# Patient Record
Sex: MALE | Race: WHITE | NOT HISPANIC OR LATINO | Employment: STUDENT | URBAN - METROPOLITAN AREA
[De-identification: names, ages, dates, MRNs, and addresses within clinical notes are randomized per-mention and may not be internally consistent; named-entity substitution may affect disease eponyms.]

---

## 2018-08-23 ENCOUNTER — RADIANT APPOINTMENT (OUTPATIENT)
Dept: ULTRASOUND IMAGING | Facility: CLINIC | Age: 23
End: 2018-08-23
Attending: FAMILY MEDICINE
Payer: COMMERCIAL

## 2018-08-23 DIAGNOSIS — N50.89 LUMP IN THE TESTICLE: ICD-10-CM

## 2019-04-24 ENCOUNTER — ANCILLARY PROCEDURE (OUTPATIENT)
Dept: MRI IMAGING | Facility: CLINIC | Age: 24
End: 2019-04-24
Attending: FAMILY MEDICINE
Payer: COMMERCIAL

## 2019-04-24 DIAGNOSIS — S79.911A INJURY OF RIGHT HIP: ICD-10-CM

## 2020-01-03 ENCOUNTER — MEDICAL CORRESPONDENCE (OUTPATIENT)
Dept: HEALTH INFORMATION MANAGEMENT | Facility: CLINIC | Age: 25
End: 2020-01-03

## 2020-01-03 ENCOUNTER — TRANSFERRED RECORDS (OUTPATIENT)
Dept: HEALTH INFORMATION MANAGEMENT | Facility: CLINIC | Age: 25
End: 2020-01-03

## 2020-01-07 ENCOUNTER — ANCILLARY PROCEDURE (OUTPATIENT)
Dept: CARDIOLOGY | Facility: CLINIC | Age: 25
End: 2020-01-07
Attending: FAMILY MEDICINE
Payer: COMMERCIAL

## 2020-01-07 DIAGNOSIS — Z13.6 SCREENING FOR HEART DISEASE: ICD-10-CM

## 2020-01-28 ENCOUNTER — TRANSFERRED RECORDS (OUTPATIENT)
Dept: HEALTH INFORMATION MANAGEMENT | Facility: CLINIC | Age: 25
End: 2020-01-28

## 2020-02-10 ENCOUNTER — MEDICAL CORRESPONDENCE (OUTPATIENT)
Dept: HEALTH INFORMATION MANAGEMENT | Facility: CLINIC | Age: 25
End: 2020-02-10

## 2020-02-10 ENCOUNTER — TRANSFERRED RECORDS (OUTPATIENT)
Dept: HEALTH INFORMATION MANAGEMENT | Facility: CLINIC | Age: 25
End: 2020-02-10

## 2020-02-11 NOTE — TELEPHONE ENCOUNTER
MEDICAL RECORDS REQUEST   [facility]  Urology Clinic  [address] [place] [address]  PHONE: [phone]  Fax: [phone]        FUTURE VISIT INFORMATION                                                   Miguel LeoraGILBERTO rojas: 1995 scheduled for future visit at [facility]    APPOINTMENT INFORMATION:    Date: 20 3PM    Provider:  Peyton Gleason MD     Reason for Visit/Diagnosis: Urethritis     REFERRAL INFORMATION:    Referring provider:  Belinda Lock    Specialty: PA-C    Referring providers clinic:  [facility] contact number:  [phone]    RECORDS REQUESTED FOR VISIT                                                     NOTES  STATUS/DETAILS   OFFICE NOTE from referring provider  yes   OFFICE NOTE from other specialist  no   DISCHARGE SUMMARY from hospital  no   DISCHARGE REPORT from the ER  no   OPERATIVE REPORT  no   MEDICATION LIST  yes     PRE-VISIT CHECKLIST      Record collection complete YES- Guillermo recs scanned in [facility] / Labs scanned in [facility]  No- Fax to [place] to obtain recs ASAP    Appointment appropriately scheduled           (right time/right provider) Yes   MyChart activation If no, please explain: In process    Questionnaire complete If no, please explain: In process      Completed by: Clarisa Hernandez

## 2020-02-12 ENCOUNTER — TRANSFERRED RECORDS (OUTPATIENT)
Dept: HEALTH INFORMATION MANAGEMENT | Facility: CLINIC | Age: 25
End: 2020-02-12

## 2020-02-18 NOTE — PROGRESS NOTES
"        Chief Complaint:   Urethritis         History of Present Illness:    Miguel Fleming is a very pleasant 24 year old male who presents for evaluation of the above. Per chart review, he was previously evaluated at [facility] for c/o burning with urination x3 days. Denied associated symptoms. STI testing was conducted, which was negative. UA/UC and mycoplasma/ureaplasma cultures were also sent, and ureaplasma came back positive. He was therefore treated with a course of doxycycline. The burning he was experiencing resolved, but he then reported difficulty with dribbling after urinating.     Today, he states that he continues to have \"disomfort\" when voiding, along with post-void dribbling. This has improved somewhat after his doxycycline course, but symptoms remain bothersome. He denies any pain elsewhere. Also denies any  pyuria, or any recent history of urinary tract infections or stones.         Past Medical History:   No past medical history on file.         Past Surgical History:   No past surgical history on file.         Medications     No current outpatient medications on file.     No current facility-administered medications for this visit.             Family History:     Family History   Problem Relation Age of Onset     Diabetes Maternal Grandfather      Heart Disease Maternal Grandfather      Hypertension Maternal Grandfather      Hypertension Father      Heart Defect Father      Nephrolithiasis Father      Hypertension Paternal Grandfather      Cerebrovascular Disease Paternal Grandfather             Social History:     Social History     Socioeconomic History     Marital status: Single     Spouse name: Not on file     Number of children: Not on file     Years of education: Not on file     Highest education level: Not on file   Occupational History     Not on file   Social Needs     Financial resource strain: Not on file     Food insecurity:     Worry: Not on file     Inability: Not on file     " "Transportation needs:     Medical: Not on file     Non-medical: Not on file   Tobacco Use     Smoking status: Never Smoker     Smokeless tobacco: Never Used   Substance and Sexual Activity     Alcohol use: Yes     Drug use: Not Currently     Types: Marijuana     Sexual activity: Not Currently     Partners: Female     Birth control/protection: Condom   Lifestyle     Physical activity:     Days per week: Not on file     Minutes per session: Not on file     Stress: Not on file   Relationships     Social connections:     Talks on phone: Not on file     Gets together: Not on file     Attends [employer] service: Not on file     Active member of club or organization: Not on file     Attends meetings of clubs or organizations: Not on file     Relationship status: Not on file     Intimate partner violence:     Fear of current or ex partner: Not on file     Emotionally abused: Not on file     Physically abused: Not on file     Forced sexual activity: Not on file   Other Topics Concern     Parent/sibling w/ CABG, MI or angioplasty before 65F 55M? No   Social History Narrative     Not on file            Allergies:   Patient has no known allergies.         Review of Systems:  From intake questionnaire   Negative 14 system review except as noted on HPI, nurse's note.         Physical Exam:   Patient is a 24 year old  male   Vitals: Blood pressure (!) 145/76, pulse (!) 49, height 1.702 m (5' 7\"), weight 65.8 kg (145 lb).  General Appearance Adult: Alert, no acute distress, oriented  Lungs: no respiratory distress, or pursed lip breathing  Heart: No obvious jugular venous distension present  Abdomen: soft, nontender, no organomegaly or masses, Body mass index is 22.71 kg/m .  : deferred     Uroflow and Post-Void Residual by Bladder Scan     PVR: 75 mL         Assessment and Plan:     Assessment: 24 year old male with urethritis-type symptoms and a previous positive ureaplasma culture, who presents today for ongoing symptoms, " including dysuria and post-void dribbling. PVR relatively low today at 75 mL, demonstrating good bladder emptying. He discussed that repeating a ureaplasma culture may be necessary, given the high resistance to doxycycline.     Plan:  -Will send repeat ureaplasma culture today, along with a repeat urinalysis and culture.   -If ureaplasma remains positive, will instead treat with a course of Cipro. If negative, would advise patient to continue monitoring his symptoms for the next few weeks and follow up with me if needed.       Dinah Castillo, CNP  Department of Urology

## 2020-02-19 ENCOUNTER — PRE VISIT (OUTPATIENT)
Dept: UROLOGY | Facility: CLINIC | Age: 25
End: 2020-02-19

## 2020-02-19 ENCOUNTER — OFFICE VISIT (OUTPATIENT)
Dept: UROLOGY | Facility: CLINIC | Age: 25
End: 2020-02-19
Payer: COMMERCIAL

## 2020-02-19 VITALS
DIASTOLIC BLOOD PRESSURE: 76 MMHG | SYSTOLIC BLOOD PRESSURE: 145 MMHG | WEIGHT: 145 LBS | BODY MASS INDEX: 22.76 KG/M2 | HEIGHT: 67 IN | HEART RATE: 49 BPM

## 2020-02-19 DIAGNOSIS — R30.0 DYSURIA: ICD-10-CM

## 2020-02-19 DIAGNOSIS — N39.43 POST-VOID DRIBBLING: Primary | ICD-10-CM

## 2020-02-19 LAB
ALBUMIN UR-MCNC: NEGATIVE MG/DL
AMORPH CRY #/AREA URNS HPF: ABNORMAL /HPF
APPEARANCE UR: ABNORMAL
BILIRUB UR QL STRIP: NEGATIVE
COLOR UR AUTO: YELLOW
GLUCOSE UR STRIP-MCNC: NEGATIVE MG/DL
HGB UR QL STRIP: NEGATIVE
KETONES UR STRIP-MCNC: NEGATIVE MG/DL
LEUKOCYTE ESTERASE UR QL STRIP: NEGATIVE
NITRATE UR QL: NEGATIVE
PH UR STRIP: 8 PH (ref 5–7)
RBC #/AREA URNS AUTO: 0 /HPF (ref 0–2)
SOURCE: ABNORMAL
SP GR UR STRIP: 1.02 (ref 1–1.03)
UROBILINOGEN UR STRIP-MCNC: 0 MG/DL (ref 0–2)
WBC #/AREA URNS AUTO: 0 /HPF (ref 0–5)

## 2020-02-19 ASSESSMENT — ENCOUNTER SYMPTOMS
SKIN CHANGES: 0
FLANK PAIN: 0
POOR WOUND HEALING: 1
HEMATURIA: 0
DYSURIA: 1
DIFFICULTY URINATING: 1
NAIL CHANGES: 1

## 2020-02-19 ASSESSMENT — PAIN SCALES - GENERAL: PAINLEVEL: NO PAIN (0)

## 2020-02-19 ASSESSMENT — MIFFLIN-ST. JEOR: SCORE: 1606.35

## 2020-02-19 NOTE — TELEPHONE ENCOUNTER
Reason for Visit: Consult    Diagnosis: Urethritis    Orders/Procedures/Records: Records available    Contact Patient: N/A    Rooming Requirements: Normal      Dena Salgado  02/19/20  10:56 AM

## 2020-02-19 NOTE — LETTER
Miguel Fleming   [address]   [facility] [address]        Hi Miguel,     I am writing you concerning your recent test results:    Results for orders placed or performed in visit on 02/19/20   UA with Microscopic     Status: Abnormal   Result Value Ref Range    Color Urine Yellow     Appearance Urine Slightly Cloudy     Glucose Urine Negative NEG^Negative mg/dL    Bilirubin Urine Negative NEG^Negative    Ketones Urine Negative NEG^Negative mg/dL    Specific Gravity Urine 1.017 1.003 - 1.035    Blood Urine Negative NEG^Negative    pH Urine 8.0 (H) 5.0 - 7.0 pH    Protein Albumin Urine Negative NEG^Negative mg/dL    Urobilinogen mg/dL 0.0 0.0 - 2.0 mg/dL    Nitrite Urine Negative NEG^Negative    Leukocyte Esterase Urine Negative NEG^Negative    Source Midstream Urine     WBC Urine 0 0 - 5 /HPF    RBC Urine 0 0 - 2 /HPF    Amorphous Crystals Moderate (A) NEG^Negative /HPF   Ureaplasma culture     Status: None   Result Value Ref Range    Specimen Description Midstream Urine     Culture Micro No Ureaplasma species isolated    Urine Culture Aerobic Bacterial     Status: None   Result Value Ref Range    Specimen Description Midstream Urine     Special Requests Specimen received in preservative     Culture Micro No growth      Resulted Orders   Ureaplasma culture   Result Value Ref Range    Specimen Description Midstream Urine     Culture Micro No Ureaplasma species isolated    UA with Microscopic   Result Value Ref Range    Color Urine Yellow     Appearance Urine Slightly Cloudy     Glucose Urine Negative NEG^Negative mg/dL    Bilirubin Urine Negative NEG^Negative    Ketones Urine Negative NEG^Negative mg/dL    Specific Gravity Urine 1.017 1.003 - 1.035    Blood Urine Negative NEG^Negative    pH Urine 8.0 (H) 5.0 - 7.0 pH    Protein Albumin Urine Negative NEG^Negative mg/dL    Urobilinogen mg/dL 0.0 0.0 - 2.0 mg/dL    Nitrite Urine Negative NEG^Negative    Leukocyte Esterase Urine Negative NEG^Negative    Source  Midstream Urine     WBC Urine 0 0 - 5 /HPF    RBC Urine 0 0 - 2 /HPF    Amorphous Crystals Moderate (A) NEG^Negative /HPF   Urine Culture Aerobic Bacterial   Result Value Ref Range    Specimen Description Midstream Urine     Special Requests Specimen received in preservative     Culture Micro No growth        Your urinalysis was negative for any signs of infection. There was noted to be crystalline material present, which is common and not of concern. Your repeat ureaplasma culture came back negative.     Please continue to stay well-hydrated and monitor your symptoms.       I would be happy to see you back in clinic to discuss results in depth.  Please let me know if you have any questions.      Sincerely,      Dinah Castillo, HONORIO        [facility] UROLOGY AND [facility]  [address]  [address] FLO[address]  [facility] [address]  Phone: [phone]  Fax: [phone]

## 2020-02-19 NOTE — PATIENT INSTRUCTIONS
UROLOGY CLINIC VISIT PATIENT INSTRUCTIONS    1) We will send your urine for repeat ureasplasma testing, along with a general urinalysis with culture. If ureaplasma remains positive, alternative treatment will be prescribed to treat this. If negative, I would advise ongoing monitoring of your symptoms over the next few weeks.     If you have any issues, questions or concerns in the meantime, do not hesitate to contact us at [phone] or via [employer]     It was a pleasure meeting with you today.  Thank you for allowing me and my team the privilege of caring for you today.  YOU are the reason we are here, and I truly hope we provided you with the excellent service you deserve.  Please let us know if there is anything else we can do for you so that we can be sure you are leaving completely satisfied with your care experience.    Dinah Castillo, CNP

## 2020-02-19 NOTE — NURSING NOTE
"Chief Complaint   Patient presents with     Consult     Urethritis       Blood pressure (!) 145/76, pulse (!) 49, height 1.702 m (5' 7\"), weight 65.8 kg (145 lb). Body mass index is 22.71 kg/m .    There is no problem list on file for this patient.      No Known Allergies    No current outpatient medications on file.       Social History     Tobacco Use     Smoking status: Not on file   Substance Use Topics     Alcohol use: Not on file     Drug use: Not on file       Dena Salgado, EMT  2/19/2020  3:19 PM     "

## 2020-02-19 NOTE — NURSING NOTE
"Chief Complaint   Patient presents with     Consult     Urethritis       Blood pressure (!) 145/76, pulse (!) 49, height 1.702 m (5' 7\"), weight 65.8 kg (145 lb). Body mass index is 22.71 kg/m .    There is no problem list on file for this patient.      No Known Allergies    No current outpatient medications on file.       Social History     Tobacco Use     Smoking status: Never Smoker     Smokeless tobacco: Never Used   Substance Use Topics     Alcohol use: Yes     Drug use: Not Currently     Types: Marijuana       Dena Salgado, EMT  2/19/2020  3:25 PM     "

## 2020-02-20 LAB
BACTERIA SPEC CULT: NO GROWTH
Lab: NORMAL
SPECIMEN SOURCE: NORMAL

## 2020-02-26 LAB
BACTERIA SPEC CULT: NORMAL
SPECIMEN SOURCE: NORMAL

## 2020-02-28 ENCOUNTER — PRE VISIT (OUTPATIENT)
Dept: UROLOGY | Facility: CLINIC | Age: 25
End: 2020-02-28

## 2021-06-14 ENCOUNTER — MEDICAL CORRESPONDENCE (OUTPATIENT)
Dept: HEALTH INFORMATION MANAGEMENT | Facility: CLINIC | Age: 26
End: 2021-06-14

## 2021-06-14 ENCOUNTER — TRANSFERRED RECORDS (OUTPATIENT)
Dept: HEALTH INFORMATION MANAGEMENT | Facility: CLINIC | Age: 26
End: 2021-06-14

## 2021-06-16 ENCOUNTER — TELEPHONE (OUTPATIENT)
Dept: SLEEP MEDICINE | Facility: CLINIC | Age: 26
End: 2021-06-16

## 2021-06-16 NOTE — TELEPHONE ENCOUNTER
Received referral from [facility] for patient to schedule virtual sleep consultation.    Messaged patient to schedule sleep consultation.

## 2021-06-23 ENCOUNTER — PRE VISIT (OUTPATIENT)
Dept: UROLOGY | Facility: CLINIC | Age: 26
End: 2021-06-23

## 2021-06-23 NOTE — TELEPHONE ENCOUNTER
Reason for Visit: Consult    Diagnosis: urethritis    Orders/Procedures/Records: in system    Contact Patient: n/a    Rooming Requirements: normal      Rach Mesa LPN  06/23/21  12:05 PM

## 2021-06-25 ENCOUNTER — VIRTUAL VISIT (OUTPATIENT)
Dept: UROLOGY | Facility: CLINIC | Age: 26
End: 2021-06-25
Payer: COMMERCIAL

## 2021-06-25 DIAGNOSIS — R39.14 FEELING OF INCOMPLETE BLADDER EMPTYING: Primary | ICD-10-CM

## 2021-06-25 PROCEDURE — 99213 OFFICE O/P EST LOW 20 MIN: CPT | Mod: 95 | Performed by: NURSE PRACTITIONER

## 2021-06-25 NOTE — PROGRESS NOTES
Miguel is a 25 year old who is being evaluated via a billable video visit.      How would you like to obtain your AVS? MyChart  If the video visit is dropped, the invitation should be resent by: Text to cell phone: [phone]  Will anyone else be joining your video visit? No    Video Start Time: 1:02 PM  Video-Visit Details    Type of service:  Video Visit    Video End Time: 1:22 PM    Originating Location (pt. Location): Home    Distant Location (provider location):  [facility] [place]     Platform used for Video Visit: [place] Fleming complains of   Chief Complaint   Patient presents with     Consult For     urethritis x 2 years on and off       Assessment/Plan:  25 year old male with a 2 year history of urinary symptoms, now with intermittent sensation of incomplete bladder emptying and post-void dribbling. Steam normal strength with no splitting or spraying. I suspect his symptoms may be related to his pelvic floor, and we discussed this today. Offered next steps, including uroflow with repeat PVR vs. a referral to pelvic floor PT for evaluation vs. ongoing monitoring and creating a log of lifestyle changes that may be impacting his symptoms. He opted to monitor and follow up with me as needed.     Dinah Castillo, CNP  Department of Urology      Subjective:   25 year old male who presents via virtual visit regarding urethritis. I last saw him on 2/19/20 for this issue. Had been treated for a positive ureplasma culture. UA/UC and repeat ureaplasma culture all sent and negative. More recently, he underwent STI testing again and was negative.     Today, he states that his symptoms were not very bothersome for a while but have now returned intermittently. Can sometimes go 1-1.5 months without any bother. His primary symptom now is feeling of incomplete bladder emptying - feeling like he is about 80% empty - with some post-void dribbling. States that he feels like the urine is  "\"caught on the edge.\" His stream otherwise seems normal with no significant spraying, splitting, or slowing. No notable hesitancy.       Objective:     Exam:   Patient is a 25 year old male   General Appearance: Well groomed, hygenic  Respiratory: No cough, no respiratory distress or labored breathing  Musculoskeletal: Grossly normal with no gross deficits  Skin: No discoloration or apparent rashes  Neurologic: No tremors  Psychiatric: Alert and oriented  Further examination is deferred due to the nature of our visit.             "

## 2021-06-25 NOTE — LETTER
6/25/2021       RE: Miguel Fleming  [address]  [facility] [place] [facility] [address]     Dear Colleague,    Thank you for referring your patient, Miguel Fleming, to the [facility] [employer] [facility]. Please see a copy of my visit note below.    Miguel is a 25 year old who is being evaluated via a billable video visit.      How would you like to obtain your AVS? MyChart  If the video visit is dropped, the invitation should be resent by: Text to cell phone: [phone]  Will anyone else be joining your video visit? No    Video Start Time: 1:02 PM  Video-Visit Details    Type of service:  Video Visit    Video End Time: 1:22 PM    Originating Location (pt. Location): Home    Distant Location (provider location):  [facility] [employer]     Platform used for Video Visit: [place]       Miguel Fleming complains of   Chief Complaint   Patient presents with     Consult For     urethritis x 2 years on and off       Assessment/Plan:  25 year old male with a 2 year history of urinary symptoms, now with intermittent sensation of incomplete bladder emptying and post-void dribbling. Steam normal strength with no splitting or spraying. I suspect his symptoms may be related to his pelvic floor, and we discussed this today. Offered next steps, including uroflow with repeat PVR vs. a referral to pelvic floor PT for evaluation vs. ongoing monitoring and creating a log of lifestyle changes that may be impacting his symptoms. He opted to monitor and follow up with me as needed.     Dinah Castillo, CNP  Department of Urology      Subjective:   25 year old male who presents via virtual visit regarding urethritis. I last saw him on 2/19/20 for this issue. Had been treated for a positive ureplasma culture. UA/UC and repeat ureaplasma culture all sent and negative. More recently, he underwent STI testing again and was negative.     Today, he states that his  "symptoms were not very bothersome for a while but have now returned intermittently. Can sometimes go 1-1.5 months without any bother. His primary symptom now is feeling of incomplete bladder emptying - feeling like he is about 80% empty - with some post-void dribbling. States that he feels like the urine is \"caught on the edge.\" His stream otherwise seems normal with no significant spraying, splitting, or slowing. No notable hesitancy.       Objective:     Exam:   Patient is a 25 year old male   General Appearance: Well groomed, hygenic  Respiratory: No cough, no respiratory distress or labored breathing  Musculoskeletal: Grossly normal with no gross deficits  Skin: No discoloration or apparent rashes  Neurologic: No tremors  Psychiatric: Alert and oriented  Further examination is deferred due to the nature of our visit.     "

## 2021-06-25 NOTE — PATIENT INSTRUCTIONS
UROLOGY CLINIC VISIT PATIENT INSTRUCTIONS    -Continue to monitor your symptoms and create a log of lifestyle components that may be influencing flare-ups.   -Follow up with me as needed moving forward.     If you have any issues, questions or concerns in the meantime, do not hesitate to contact us at [phone] or via [employer].     It was a pleasure meeting with you today.  Thank you for allowing me and my team the privilege of caring for you today.  YOU are the reason we are here, and I truly hope we provided you with the excellent service you deserve.  Please let us know if there is anything else we can do for you so that we can be sure you are leaving completely satisfied with your care experience.    Dinah Castillo, CNP

## 2021-08-14 ENCOUNTER — HEALTH MAINTENANCE LETTER (OUTPATIENT)
Age: 26
End: 2021-08-14

## 2021-10-10 ENCOUNTER — HEALTH MAINTENANCE LETTER (OUTPATIENT)
Age: 26
End: 2021-10-10

## 2022-03-23 ASSESSMENT — ANXIETY QUESTIONNAIRES
7. FEELING AFRAID AS IF SOMETHING AWFUL MIGHT HAPPEN: MORE THAN HALF THE DAYS
6. BECOMING EASILY ANNOYED OR IRRITABLE: MORE THAN HALF THE DAYS
GAD7 TOTAL SCORE: 13
7. FEELING AFRAID AS IF SOMETHING AWFUL MIGHT HAPPEN: MORE THAN HALF THE DAYS
3. WORRYING TOO MUCH ABOUT DIFFERENT THINGS: SEVERAL DAYS
2. NOT BEING ABLE TO STOP OR CONTROL WORRYING: SEVERAL DAYS
1. FEELING NERVOUS, ANXIOUS, OR ON EDGE: MORE THAN HALF THE DAYS
4. TROUBLE RELAXING: MORE THAN HALF THE DAYS
GAD7 TOTAL SCORE: 13
GAD7 TOTAL SCORE: 13
5. BEING SO RESTLESS THAT IT IS HARD TO SIT STILL: NEARLY EVERY DAY

## 2022-03-24 ASSESSMENT — ANXIETY QUESTIONNAIRES: GAD7 TOTAL SCORE: 13

## 2022-03-28 ENCOUNTER — TELEPHONE (OUTPATIENT)
Dept: BEHAVIORAL HEALTH | Facility: CLINIC | Age: 27
End: 2022-03-28
Payer: COMMERCIAL

## 2022-03-28 NOTE — TELEPHONE ENCOUNTER
Spoke with pt to remind them about their video visit on Wednesday 3/30/22 at 4 pm, that will be connect through via My Chart.    Writer also mention to pt to login and complete e-check in questions 15 - 30 min prior to appt time.

## 2022-03-30 ENCOUNTER — VIRTUAL VISIT (OUTPATIENT)
Dept: PSYCHOLOGY | Facility: CLINIC | Age: 27
End: 2022-03-30
Payer: COMMERCIAL

## 2022-03-30 DIAGNOSIS — F41.1 GAD (GENERALIZED ANXIETY DISORDER): ICD-10-CM

## 2022-03-30 DIAGNOSIS — F33.2 MAJOR DEPRESSIVE DISORDER, RECURRENT EPISODE, SEVERE WITH ANXIOUS DISTRESS (H): Primary | ICD-10-CM

## 2022-03-30 PROCEDURE — 90791 PSYCH DIAGNOSTIC EVALUATION: CPT | Mod: 95 | Performed by: SOCIAL WORKER

## 2022-03-30 ASSESSMENT — COLUMBIA-SUICIDE SEVERITY RATING SCALE - C-SSRS
ATTEMPT LIFETIME: NO
4. HAVE YOU HAD THESE THOUGHTS AND HAD SOME INTENTION OF ACTING ON THEM?: NO
TOTAL  NUMBER OF ABORTED OR SELF INTERRUPTED ATTEMPTS LIFETIME: NO
5. HAVE YOU STARTED TO WORK OUT OR WORKED OUT THE DETAILS OF HOW TO KILL YOURSELF? DO YOU INTEND TO CARRY OUT THIS PLAN?: NO
REASONS FOR IDEATION LIFETIME: EQUALLY TO GET ATTENTION, REVENGE, OR A REACTION FROM OTHERS AND TO END/STOP THE PAIN
REASONS FOR IDEATION PAST MONTH: MOSTLY TO END OR STOP THE PAIN (YOU COULDN'T GO ON LIVING WITH THE PAIN OR HOW YOU WERE FEELING)
3. HAVE YOU BEEN THINKING ABOUT HOW YOU MIGHT KILL YOURSELF?: YES
1. IN THE PAST MONTH, HAVE YOU WISHED YOU WERE DEAD OR WISHED YOU COULD GO TO SLEEP AND NOT WAKE UP?: NO
2. HAVE YOU ACTUALLY HAD ANY THOUGHTS OF KILLING YOURSELF?: YES
1. HAVE YOU WISHED YOU WERE DEAD OR WISHED YOU COULD GO TO SLEEP AND NOT WAKE UP?: YES
TOTAL  NUMBER OF INTERRUPTED ATTEMPTS LIFETIME: NO
2. HAVE YOU ACTUALLY HAD ANY THOUGHTS OF KILLING YOURSELF?: NO
6. HAVE YOU EVER DONE ANYTHING, STARTED TO DO ANYTHING, OR PREPARED TO DO ANYTHING TO END YOUR LIFE?: NO

## 2022-03-30 ASSESSMENT — PATIENT HEALTH QUESTIONNAIRE - PHQ9
SUM OF ALL RESPONSES TO PHQ QUESTIONS 1-9: 21
10. IF YOU CHECKED OFF ANY PROBLEMS, HOW DIFFICULT HAVE THESE PROBLEMS MADE IT FOR YOU TO DO YOUR WORK, TAKE CARE OF THINGS AT HOME, OR GET ALONG WITH OTHER PEOPLE: VERY DIFFICULT
SUM OF ALL RESPONSES TO PHQ QUESTIONS 1-9: 21
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
## 2022-03-31 ASSESSMENT — PATIENT HEALTH QUESTIONNAIRE - PHQ9: SUM OF ALL RESPONSES TO PHQ QUESTIONS 1-9: 21

## 2022-04-21 ENCOUNTER — VIRTUAL VISIT (OUTPATIENT)
Dept: PSYCHOLOGY | Facility: CLINIC | Age: 27
End: 2022-04-21
Payer: COMMERCIAL

## 2022-04-21 DIAGNOSIS — F41.1 GAD (GENERALIZED ANXIETY DISORDER): ICD-10-CM

## 2022-04-21 DIAGNOSIS — F33.2 MAJOR DEPRESSIVE DISORDER, RECURRENT EPISODE, SEVERE WITH ANXIOUS DISTRESS (H): Primary | ICD-10-CM

## 2022-04-21 PROCEDURE — 90834 PSYTX W PT 45 MINUTES: CPT | Mod: 95 | Performed by: SOCIAL WORKER

## 2022-04-21 NOTE — PROGRESS NOTES
[facility]                                     Progress Note    Patient Name: Miguel Fleming  Date: 22         Service Type: Individual      Session Start Time: 10:00  Session End Time: 10:45     Session Length: 45    Session #: 2    Attendees: Client    Service Modality:  Video Visit:      Provider verified identity through the following two step process.  Patient provided:  Patient photo and Patient     Telemedicine Visit: The patient's condition can be safely assessed and treated via synchronous audio and visual telemedicine encounter.      Reason for Telemedicine Visit: Patient has requested telehealth visit    Originating Site (Patient Location): Patient's home    Distant Site (Provider Location): Provider Remote Setting- Home Office    Consent:  The patient/guardian has verbally consented to: the potential risks and benefits of telemedicine (video visit) versus in person care; bill my insurance or make self-payment for services provided; and responsibility for payment of non-covered services.     Patient would like the video invitation sent by:  My Chart    Mode of Communication:  Video Conference via Amwell    As the provider I attest to compliance with applicable laws and regulations related to telemedicine.    DATA  Interactive Complexity: No  Crisis: No        Progress Since Last Session (Related to Symptoms / Goals / Homework):   Symptoms: No change continued anxiety and depression    Homework: Partially completed      Episode of Care Goals: Minimal progress - PREPARATION (Decided to change - considering how); Intervened by negotiating a change plan and determining options / strategies for behavior change, identifying triggers, exploring social supports, and working towards setting a date to begin behavior change     Current / Ongoing Stressors and Concerns:   Past relationship issues, job and school stress, at times family relationship issues     Treatment Objective(s)  Addressed in This Session:   Connect to strengths and ego integrity  Journal and determine issues and themes he is writing about  Continue to his regular routine and activities that make him feel good; improve sleep, exercise, waking up and finishing the day on schedule     Intervention:   Motivational Interviewing: [employer]  Strength-Based Therapy    Assessments completed prior to visit:  The following assessments were completed by patient for this visit:  PHQ9:   PHQ-9 SCORE 3/30/2022   PHQ-9 Total Score MyChart 21 (Severe depression)   PHQ-9 Total Score 21     GAD7:   DANNY-7 SCORE 3/23/2022   Total Score 13 (moderate anxiety)   Total Score 13     PROMIS 10-Global Health (all questions and answers displayed):   PROMIS 10 3/23/2022   In general, would you say your health is: Good   In general, would you say your quality of life is: Fair   In general, how would you rate your physical health? Good   In general, how would you rate your mental health, including your mood and your ability to think? Fair   In general, how would you rate your satisfaction with your social activities and relationships? Fair   In general, please rate how well you carry out your usual social activities and roles Fair   To what extent are you able to carry out your everyday physical activities such as walking, climbing stairs, carrying groceries, or moving a chair? Mostly   How often have you been bothered by emotional problems such as feeling anxious, depressed or irritable? Often   How would you rate your fatigue on average? Moderate   How would you rate your pain on average?   0 = No Pain  to  10 = Worst Imaginable Pain 6   In general, would you say your health is: 3   In general, would you say your quality of life is: 2   In general, how would you rate your physical health? 3   In general, how would you rate your mental health, including your mood and your ability to think? 2   In general, how would you rate your satisfaction with your  social activities and relationships? 2   In general, please rate how well you carry out your usual social activities and roles. (This includes activities at home, at work and in your community, and responsibilities as a parent, child, spouse, employee, friend, etc.) 2   To what extent are you able to carry out your everyday physical activities such as walking, climbing stairs, carrying groceries, or moving a chair? 4   In the past 7 days, how often have you been bothered by emotional problems such as feeling anxious, depressed, or irritable? 4   In the past 7 days, how would you rate your fatigue on average? 3   In the past 7 days, how would you rate your pain on average, where 0 means no pain, and 10 means worst imaginable pain? 6   Global Mental Health Score 8   Global Physical Health Score 13   PROMIS TOTAL - SUBSCORES 21         ASSESSMENT: Current Emotional / Mental Status (status of significant symptoms):   Risk status (Self / Other harm or suicidal ideation)   Patient denies current fears or concerns for personal safety.   Patient reports the following current or recent suicidal ideation or behaviors: At times patient has passive intrusive thoughts of not wanting to be alive without a plan or strong intention to hurt or harm self and has strong protective factors. .   Patient denies current or recent homicidal ideation or behaviors.   Patient denies current or recent self injurious behavior or ideation.   Patient denies other safety concerns.   Patient reports there has been no change in risk factors since their last session.     Patient reports there has been no change in protective factors since their last session.     Recommended that patient call 911 or go to the local ED should there be a change in any of these risk factors.     Appearance:   Appropriate    Eye Contact:   Good    Psychomotor Behavior: Normal    Attitude:   Cooperative  Friendly Pleasant Attentive   Orientation:   All   Speech    Rate /  Production: Normal/ Responsive    Volume:  Normal    Mood:    Anxious  Depressed  Ambivalence   Affect:    Worrisome    Thought Content:  Rumination    Thought Form:  Blocking    Insight:    Good      Medication Review:   No current psychiatric medications prescribed     Medication Compliance:   NA     Changes in Health Issues:   None reported     Chemical Use Review:   Substance Use: Chemical use reviewed, no active concerns identified   Continue assessing during course of therapy.     Tobacco Use: No current tobacco use.      Diagnosis:  1. Major depressive disorder, recurrent episode, severe with anxious distress (H)    2. DANNY (generalized anxiety disorder)        Collateral Reports Completed:   Not Applicable    PLAN: (Patient Tasks / Therapist Tasks / Other)  Worked on Tx plan in session today.  Patient will continue to journal and look for themes in his journaling that is connected to his ego despair. Continue to follow with his healthy routines; exercise, starting and ending his day on a schedule, journaling, sleep hygiene strategies.         Babar Gil, [facility]                                                         ______________________________________________________________________    Individual Treatment Plan    Patient's Name: Miguel Fleming  YOB: 1995    Date of Creation: 4-21-22  Date Treatment Plan Last Reviewed/Revised: 4-21-22    DSM5 Diagnoses: 296.33 (F33.2) Major Depressive Disorder, Recurrent Episode, Severe _ and With anxious distress or 300.02 (F41.1) Generalized Anxiety Disorder  Psychosocial / Contextual Factors: Past relationship issues, job and school stress, at times family relationship issues    PROMIS (reviewed every 90 days): 3-23-22    Referral / Collaboration:  Referral to another professional/service is not indicated at this time..    Anticipated number of session for this episode of care: 12  Anticipation frequency of session: Every other week  Anticipated Duration  of each session: 38-52 minutes  Treatment plan will be reviewed in 90 days or when goals have been changed.       MeasurableTreatment Goal(s) related to diagnosis / functional impairment(s)  Goal 1: Patient will decrease his depression and anxiety symptoms and improve his overall functioning.    I will know I've met my goal when I am sleeping better, have addressed my past relationship in a healthy way, deal with my compulsions in a positive way and feel happier in life.      Objective #A (Patient Action)    Patient will use cognitive strategies identified in therapy to challenge anxious thoughts.  Status: Continued - Date(s): 4-21-22    Intervention(s)  Therapist will teach CBT skills and thought stopping process and practice until skills becomes automatic.    Objective #B  Patient will improve his overall self esteem. Move towards connecting to positive ego integrity.   Status: Continued - Date(s): 4-21-22    Intervention(s)  Therapist will assign homework continue journaling, concentrate on daily strengths and positive affirmations      Objective #C  Patient will improve his overall sleep.  Status: Continued - Date(s): 4-21-22    Intervention(s)  Therapist will engage in sleep hygiene strategies to improve his overall sleep.       Patient has reviewed and agreed to the above plan.      Babar Gil, [facility]  April 21, 2022

## 2022-04-28 ENCOUNTER — VIRTUAL VISIT (OUTPATIENT)
Dept: PSYCHOLOGY | Facility: CLINIC | Age: 27
End: 2022-04-28
Payer: COMMERCIAL

## 2022-04-28 DIAGNOSIS — F33.2 MAJOR DEPRESSIVE DISORDER, RECURRENT EPISODE, SEVERE WITH ANXIOUS DISTRESS (H): Primary | ICD-10-CM

## 2022-04-28 DIAGNOSIS — F41.1 GAD (GENERALIZED ANXIETY DISORDER): ICD-10-CM

## 2022-04-28 PROCEDURE — 90834 PSYTX W PT 45 MINUTES: CPT | Mod: 95 | Performed by: SOCIAL WORKER

## 2022-04-28 NOTE — PROGRESS NOTES
[facility]                                     Progress Note    Patient Name: Miguel Fleming  Date: 22         Service Type: Individual      Session Start Time: 10:01  Session End Time: 10:46     Session Length: 45    Session #: 3    Attendees: Client    Service Modality:  Video Visit:      Provider verified identity through the following two step process.  Patient provided:  Patient photo and Patient     Telemedicine Visit: The patient's condition can be safely assessed and treated via synchronous audio and visual telemedicine encounter.      Reason for Telemedicine Visit: Patient has requested telehealth visit    Originating Site (Patient Location): Patient's home    Distant Site (Provider Location): Provider Remote Setting- Home Office    Consent:  The patient/guardian has verbally consented to: the potential risks and benefits of telemedicine (video visit) versus in person care; bill my insurance or make self-payment for services provided; and responsibility for payment of non-covered services.     Patient would like the video invitation sent by:  My Chart    Mode of Communication:  Video Conference via Amwell    As the provider I attest to compliance with applicable laws and regulations related to telemedicine.    DATA  Interactive Complexity: No  Crisis: No        Progress Since Last Session (Related to Symptoms / Goals / Homework):   Symptoms: No change continued anxiety and depression    Homework: Achieved / completed to satisfaction  Client was able to journal and look at themes of his journaling and we concentrated on the issues he thinks about most often and how he can feel better about these issues in his life.        Episode of Care Goals: Satisfactory progress - ACTION (Actively working towards change); Intervened by reinforcing change plan / affirming steps taken     Current / Ongoing Stressors and Concerns:   Past relationship issues, job and school stress, at times family  relationship issues     Treatment Objective(s) Addressed in This Session:   Connect to strengths and ego integrity  Journal and determine issues and themes he is writing about  Continue to his regular routine and activities that make him feel good; improve sleep, exercise, waking up and finishing the day on schedule     Intervention:   Motivational Interviewing: [employer]  Strength-Based Therapy    Assessments completed prior to visit:  The following assessments were completed by patient for this visit:  PHQ9:   PHQ-9 SCORE 3/30/2022   PHQ-9 Total Score MyChart 21 (Severe depression)   PHQ-9 Total Score 21     GAD7:   DANNY-7 SCORE 3/23/2022   Total Score 13 (moderate anxiety)   Total Score 13     PROMIS 10-Global Health (all questions and answers displayed):   PROMIS 10 3/23/2022   In general, would you say your health is: Good   In general, would you say your quality of life is: Fair   In general, how would you rate your physical health? Good   In general, how would you rate your mental health, including your mood and your ability to think? Fair   In general, how would you rate your satisfaction with your social activities and relationships? Fair   In general, please rate how well you carry out your usual social activities and roles Fair   To what extent are you able to carry out your everyday physical activities such as walking, climbing stairs, carrying groceries, or moving a chair? Mostly   How often have you been bothered by emotional problems such as feeling anxious, depressed or irritable? Often   How would you rate your fatigue on average? Moderate   How would you rate your pain on average?   0 = No Pain  to  10 = Worst Imaginable Pain 6   In general, would you say your health is: 3   In general, would you say your quality of life is: 2   In general, how would you rate your physical health? 3   In general, how would you rate your mental health, including your mood and your ability to think? 2   In general,  how would you rate your satisfaction with your social activities and relationships? 2   In general, please rate how well you carry out your usual social activities and roles. (This includes activities at home, at work and in your community, and responsibilities as a parent, child, spouse, employee, friend, etc.) 2   To what extent are you able to carry out your everyday physical activities such as walking, climbing stairs, carrying groceries, or moving a chair? 4   In the past 7 days, how often have you been bothered by emotional problems such as feeling anxious, depressed, or irritable? 4   In the past 7 days, how would you rate your fatigue on average? 3   In the past 7 days, how would you rate your pain on average, where 0 means no pain, and 10 means worst imaginable pain? 6   Global Mental Health Score 8   Global Physical Health Score 13   PROMIS TOTAL - SUBSCORES 21         ASSESSMENT: Current Emotional / Mental Status (status of significant symptoms):   Risk status (Self / Other harm or suicidal ideation)   Patient denies current fears or concerns for personal safety.   Patient reports the following current or recent suicidal ideation or behaviors: At times patient has passive intrusive thoughts of not wanting to be alive without a plan or strong intention to hurt or harm self and has strong protective factors. .   Patient denies current or recent homicidal ideation or behaviors.   Patient denies current or recent self injurious behavior or ideation.   Patient denies other safety concerns.   Patient reports there has been no change in risk factors since their last session.     Patient reports there has been no change in protective factors since their last session.     Recommended that patient call 911 or go to the local ED should there be a change in any of these risk factors.     Appearance:   Appropriate    Eye Contact:   Good    Psychomotor Behavior: Normal    Attitude:   Cooperative  Friendly Pleasant  Attentive   Orientation:   All   Speech    Rate / Production: Normal/ Responsive    Volume:  Normal    Mood:    Anxious  Depressed  Ambivalence   Affect:    Worrisome    Thought Content:  Rumination    Thought Form:  Blocking    Insight:    Good      Medication Review:   No current psychiatric medications prescribed     Medication Compliance:   NA     Changes in Health Issues:   None reported     Chemical Use Review:   Substance Use: Chemical use reviewed, no active concerns identified   Continue assessing during course of therapy.     Tobacco Use: No current tobacco use.      Diagnosis:  1. Major depressive disorder, recurrent episode, severe with anxious distress (H)    2. DANNY (generalized anxiety disorder)        Collateral Reports Completed:   Not Applicable    PLAN: (Patient Tasks / Therapist Tasks / Other)  Previous sessions:  Worked on Tx plan in session today. Current session: Patient will continue to journal and look for themes in his journaling that is connected to his ego despair. Continue to follow with his healthy routines; exercise, starting and ending his day on a schedule, journaling, sleep hygiene strategies. Apply self compassion to core hurts that are triggered.  Concentrate on connecting to positive affirmations and strengths about himself and think less about having to be at his best all of the time and less connection to perfectionistic traits.        Babar Gil, [facility]                                                         ______________________________________________________________________    Individual Treatment Plan    Patient's Name: Miguel Fleming  YOB: 1995    Date of Creation: 4-21-22  Date Treatment Plan Last Reviewed/Revised: 4-21-22    DSM5 Diagnoses: 296.33 (F33.2) Major Depressive Disorder, Recurrent Episode, Severe _ and With anxious distress or 300.02 (F41.1) Generalized Anxiety Disorder  Psychosocial / Contextual Factors: Past relationship issues, job and school  stress, at times family relationship issues    PROMIS (reviewed every 90 days): 3-23-22    Referral / Collaboration:  Referral to another professional/service is not indicated at this time..    Anticipated number of session for this episode of care: 12  Anticipation frequency of session: Every other week  Anticipated Duration of each session: 38-52 minutes  Treatment plan will be reviewed in 90 days or when goals have been changed.       MeasurableTreatment Goal(s) related to diagnosis / functional impairment(s)  Goal 1: Patient will decrease his depression and anxiety symptoms and improve his overall functioning.    I will know I've met my goal when I am sleeping better, have addressed my past relationship in a healthy way, deal with my compulsions in a positive way and feel happier in life.      Objective #A (Patient Action)    Patient will use cognitive strategies identified in therapy to challenge anxious thoughts.  Status: Continued - Date(s): 4-21-22    Intervention(s)  Therapist will teach CBT skills and thought stopping process and practice until skills becomes automatic.    Objective #B  Patient will improve his overall self esteem. Move towards connecting to positive ego integrity.   Status: Continued - Date(s): 4-21-22    Intervention(s)  Therapist will assign homework continue journaling, concentrate on daily strengths and positive affirmations      Objective #C  Patient will improve his overall sleep.  Status: Continued - Date(s): 4-21-22    Intervention(s)  Therapist will engage in sleep hygiene strategies to improve his overall sleep.       Patient has reviewed and agreed to the above plan.      Babar Gil, [facility]  April 21, 2022

## 2022-05-04 ENCOUNTER — VIRTUAL VISIT (OUTPATIENT)
Dept: PSYCHOLOGY | Facility: CLINIC | Age: 27
End: 2022-05-04
Payer: COMMERCIAL

## 2022-05-04 DIAGNOSIS — F41.1 GAD (GENERALIZED ANXIETY DISORDER): ICD-10-CM

## 2022-05-04 DIAGNOSIS — F33.2 MAJOR DEPRESSIVE DISORDER, RECURRENT EPISODE, SEVERE WITH ANXIOUS DISTRESS (H): Primary | ICD-10-CM

## 2022-05-04 PROCEDURE — 90834 PSYTX W PT 45 MINUTES: CPT | Mod: 95 | Performed by: SOCIAL WORKER

## 2022-05-04 ASSESSMENT — ANXIETY QUESTIONNAIRES
6. BECOMING EASILY ANNOYED OR IRRITABLE: SEVERAL DAYS
3. WORRYING TOO MUCH ABOUT DIFFERENT THINGS: NOT AT ALL
IF YOU CHECKED OFF ANY PROBLEMS ON THIS QUESTIONNAIRE, HOW DIFFICULT HAVE THESE PROBLEMS MADE IT FOR YOU TO DO YOUR WORK, TAKE CARE OF THINGS AT HOME, OR GET ALONG WITH OTHER PEOPLE: SOMEWHAT DIFFICULT
2. NOT BEING ABLE TO STOP OR CONTROL WORRYING: NOT AT ALL
1. FEELING NERVOUS, ANXIOUS, OR ON EDGE: SEVERAL DAYS
GAD7 TOTAL SCORE: 4
5. BEING SO RESTLESS THAT IT IS HARD TO SIT STILL: SEVERAL DAYS
7. FEELING AFRAID AS IF SOMETHING AWFUL MIGHT HAPPEN: SEVERAL DAYS

## 2022-05-04 ASSESSMENT — PATIENT HEALTH QUESTIONNAIRE - PHQ9
5. POOR APPETITE OR OVEREATING: NOT AT ALL
SUM OF ALL RESPONSES TO PHQ QUESTIONS 1-9: 9

## 2022-05-04 NOTE — PROGRESS NOTES
[facility]                                     Progress Note    Patient Name: Miguel Fleming  Date: 22         Service Type: Individual      Session Start Time: 8:30  Session End Time: 9:15     Session Length: 45    Session #: 4    Attendees: Client    Service Modality:  Video Visit:      Provider verified identity through the following two step process.  Patient provided:  Patient photo and Patient     Telemedicine Visit: The patient's condition can be safely assessed and treated via synchronous audio and visual telemedicine encounter.      Reason for Telemedicine Visit: Patient has requested telehealth visit    Originating Site (Patient Location): Patient's home    Distant Site (Provider Location): Provider Remote Setting- Home Office    Consent:  The patient/guardian has verbally consented to: the potential risks and benefits of telemedicine (video visit) versus in person care; bill my insurance or make self-payment for services provided; and responsibility for payment of non-covered services.     Patient would like the video invitation sent by:  My Chart    Mode of Communication:  Video Conference via Amwell    As the provider I attest to compliance with applicable laws and regulations related to telemedicine.    DATA  Interactive Complexity: No  Crisis: No        Progress Since Last Session (Related to Symptoms / Goals / Homework):   Symptoms: Improving less anxiety and depression symptoms this session    Homework: Achieved / completed to satisfaction Previous sessions:  Client was able to journal and look at themes of his journaling and we concentrated on the issues he thinks about most often and how he can feel better about these issues in his life.  Current session: Client is continuing to journal and look at themes that connect to ego despair or low self esteem and worth issues.  Most of this thinking is from his past relationship and how she made him feel and how this has  effected his self esteem, confidence and character. He is working on being more gracious, accepting compliments and recognition.  Apply self compassion to core hurts that are triggered to raise his overall self esteem.        Episode of Care Goals: Satisfactory progress - ACTION (Actively working towards change); Intervened by reinforcing change plan / affirming steps taken     Current / Ongoing Stressors and Concerns:   Past relationship issues, job and school stress, at times family relationship issues     Treatment Objective(s) Addressed in This Session:   Connect to strengths and ego integrity  Journal and determine issues and themes he is writing about  Continue to his regular routine and activities that make him feel good; improve sleep, exercise, waking up and finishing the day on schedule     Intervention:   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  Strength-Based Therapy    Assessments completed prior to visit:  The following assessments were completed by patient for this visit:  PHQ9:   PHQ-9 SCORE 3/30/2022 5/4/2022   PHQ-9 Total Score MyChart 21 (Severe depression) -   PHQ-9 Total Score 21 9     GAD7:   DANNY-7 SCORE 3/23/2022 5/4/2022   Total Score 13 (moderate anxiety) -   Total Score 13 4     PROMIS 10-Global Health (all questions and answers displayed):   PROMIS 10 3/23/2022   In general, would you say your health is: Good   In general, would you say your quality of life is: Fair   In general, how would you rate your physical health? Good   In general, how would you rate your mental health, including your mood and your ability to think? Fair   In general, how would you rate your satisfaction with your social activities and relationships? Fair   In general, please rate how well you carry out your usual social activities and roles Fair   To what extent are you able to carry out your everyday physical activities such as walking, climbing stairs, carrying groceries, or moving a chair? Mostly   How often have  you been bothered by emotional problems such as feeling anxious, depressed or irritable? Often   How would you rate your fatigue on average? Moderate   How would you rate your pain on average?   0 = No Pain  to  10 = Worst Imaginable Pain 6   In general, would you say your health is: 3   In general, would you say your quality of life is: 2   In general, how would you rate your physical health? 3   In general, how would you rate your mental health, including your mood and your ability to think? 2   In general, how would you rate your satisfaction with your social activities and relationships? 2   In general, please rate how well you carry out your usual social activities and roles. (This includes activities at home, at work and in your community, and responsibilities as a parent, child, spouse, employee, friend, etc.) 2   To what extent are you able to carry out your everyday physical activities such as walking, climbing stairs, carrying groceries, or moving a chair? 4   In the past 7 days, how often have you been bothered by emotional problems such as feeling anxious, depressed, or irritable? 4   In the past 7 days, how would you rate your fatigue on average? 3   In the past 7 days, how would you rate your pain on average, where 0 means no pain, and 10 means worst imaginable pain? 6   Global Mental Health Score 8   Global Physical Health Score 13   PROMIS TOTAL - SUBSCORES 21         ASSESSMENT: Current Emotional / Mental Status (status of significant symptoms):   Risk status (Self / Other harm or suicidal ideation)   Patient denies current fears or concerns for personal safety.   Patient reports the following current or recent suicidal ideation or behaviors: At times patient has passive intrusive thoughts of not wanting to be alive without a plan or strong intention to hurt or harm self and has strong protective factors. .   Patient denies current or recent homicidal ideation or behaviors.   Patient denies current  or recent self injurious behavior or ideation.   Patient denies other safety concerns.   Patient reports there has been no change in risk factors since their last session.     Patient reports there has been no change in protective factors since their last session.     Recommended that patient call 911 or go to the local ED should there be a change in any of these risk factors.     Appearance:   Appropriate    Eye Contact:   Good    Psychomotor Behavior: Normal    Attitude:   Cooperative  Friendly Pleasant Attentive   Orientation:   All   Speech    Rate / Production: Normal/ Responsive    Volume:  Normal    Mood:    Anxious  Depressed  Ambivalence   Affect:    Worrisome    Thought Content:  Rumination    Thought Form:  Blocking    Insight:    Good      Medication Review:   No current psychiatric medications prescribed     Medication Compliance:   NA     Changes in Health Issues:   None reported     Chemical Use Review:   Substance Use: Chemical use reviewed, no active concerns identified   Continue assessing during course of therapy.     Tobacco Use: No current tobacco use.      Diagnosis:  1. Major depressive disorder, recurrent episode, severe with anxious distress (H)    2. DANNY (generalized anxiety disorder)        Collateral Reports Completed:   Not Applicable    PLAN: (Patient Tasks / Therapist Tasks / Other)  Previous sessions:  Worked on Tx plan in session today. Current session: Patient will continue to journal and look for themes in his journaling that is connected to his ego despair. Continue to follow with his healthy routines; exercise, starting and ending his day on a schedule, journaling, sleep hygiene strategies. Apply self compassion to core hurts that are triggered.  Concentrate on connecting to positive affirmations and strengths about himself and think less about having to be at his best all of the time and less connection to perfectionistic traits.  Be open to accepting compliments, accolades and  positive things that are said about him.  Next session we will talk about a possible psych evaluation for possible ADHD diagnosis since he is having difficulty with concentration and attention.          Babar Gil, LICSW                                                         ______________________________________________________________________    Individual Treatment Plan    Patient's Name: Miguel Fleming  YOB: 1995    Date of Creation: 4-21-22  Date Treatment Plan Last Reviewed/Revised: 4-21-22    DSM5 Diagnoses: 296.33 (F33.2) Major Depressive Disorder, Recurrent Episode, Severe _ and With anxious distress or 300.02 (F41.1) Generalized Anxiety Disorder  Psychosocial / Contextual Factors: Past relationship issues, job and school stress, at times family relationship issues    PROMIS (reviewed every 90 days): 3-23-22    Referral / Collaboration:  Referral to another professional/service is not indicated at this time..    Anticipated number of session for this episode of care: 12  Anticipation frequency of session: Every other week  Anticipated Duration of each session: 38-52 minutes  Treatment plan will be reviewed in 90 days or when goals have been changed.       MeasurableTreatment Goal(s) related to diagnosis / functional impairment(s)  Goal 1: Patient will decrease his depression and anxiety symptoms and improve his overall functioning.    I will know I've met my goal when I am sleeping better, have addressed my past relationship in a healthy way, deal with my compulsions in a positive way and feel happier in life.      Objective #A (Patient Action)    Patient will use cognitive strategies identified in therapy to challenge anxious thoughts.  Status: Continued - Date(s): 4-21-22    Intervention(s)  Therapist will teach CBT skills and thought stopping process and practice until skills becomes automatic.    Objective #B  Patient will improve his overall self esteem. Move towards connecting to  positive ego integrity.   Status: Continued - Date(s): 4-21-22    Intervention(s)  Therapist will assign homework continue journaling, concentrate on daily strengths and positive affirmations      Objective #C  Patient will improve his overall sleep.  Status: Continued - Date(s): 4-21-22    Intervention(s)  Therapist will engage in sleep hygiene strategies to improve his overall sleep.       Patient has reviewed and agreed to the above plan.      Babar Gil, [facility]  April 21, 2022

## 2022-05-05 ASSESSMENT — ANXIETY QUESTIONNAIRES: GAD7 TOTAL SCORE: 4

## 2022-05-10 ENCOUNTER — VIRTUAL VISIT (OUTPATIENT)
Dept: PSYCHOLOGY | Facility: CLINIC | Age: 27
End: 2022-05-10
Payer: COMMERCIAL

## 2022-05-10 DIAGNOSIS — F33.2 MAJOR DEPRESSIVE DISORDER, RECURRENT EPISODE, SEVERE WITH ANXIOUS DISTRESS (H): Primary | ICD-10-CM

## 2022-05-10 DIAGNOSIS — F41.1 GAD (GENERALIZED ANXIETY DISORDER): ICD-10-CM

## 2022-05-10 PROCEDURE — 90834 PSYTX W PT 45 MINUTES: CPT | Mod: 95 | Performed by: SOCIAL WORKER

## 2022-05-10 NOTE — PROGRESS NOTES
[facility]                                     Progress Note    Patient Name: Miguel Fleming  Date: 5-10-22         Service Type: Individual      Session Start Time: 5:00  Session End Time: 5:50     Session Length: 50    Session #: 5    Attendees: Client    Service Modality:  Video Visit:      Provider verified identity through the following two step process.  Patient provided:  Patient photo and Patient     Telemedicine Visit: The patient's condition can be safely assessed and treated via synchronous audio and visual telemedicine encounter.      Reason for Telemedicine Visit: Patient has requested telehealth visit    Originating Site (Patient Location): Patient's home    Distant Site (Provider Location): Provider Remote Setting- Home Office    Consent:  The patient/guardian has verbally consented to: the potential risks and benefits of telemedicine (video visit) versus in person care; bill my insurance or make self-payment for services provided; and responsibility for payment of non-covered services.     Patient would like the video invitation sent by:  My Chart    Mode of Communication:  Video Conference via Amwell    As the provider I attest to compliance with applicable laws and regulations related to telemedicine.    DATA  Interactive Complexity: No  Crisis: No        Progress Since Last Session (Related to Symptoms / Goals / Homework):   Symptoms: Improving less anxiety and depression symptoms this session    Homework: Achieved / completed to satisfaction Previous sessions:  Client was able to journal and look at themes of his journaling and we concentrated on the issues he thinks about most often and how he can feel better about these issues in his life.  Current session: Client is continuing to journal and look at themes that connect to ego despair or low self esteem and worth issues.  Most of this thinking is from his past relationship and how she made him feel and how this has  effected his self esteem, confidence and character. He is working on being more gracious, accepting compliments and recognition.  Apply self compassion to core hurts that are triggered to raise his overall self esteem. [facility] and client would like to evaluated for this in the future. Continue to lessen his perfectionistic traits and concentrate on his strengths, positive affirmations and abilities.        Episode of Care Goals: Satisfactory progress - ACTION (Actively working towards change); Intervened by reinforcing change plan / affirming steps taken     Current / Ongoing Stressors and Concerns:   Past relationship issues, job and school stress, at times family relationship issues     Treatment Objective(s) Addressed in This Session:   Connect to strengths and ego integrity  Journal and determine issues and themes he is writing about  Continue to his regular routine and activities that make him feel good; improve sleep, exercise, waking up and finishing the day on schedule     Intervention:   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  Strength-Based Therapy    Assessments completed prior to visit:  The following assessments were completed by patient for this visit:  PHQ9:   PHQ-9 SCORE 3/30/2022 5/4/2022   PHQ-9 Total Score MyChart 21 (Severe depression) -   PHQ-9 Total Score 21 9     GAD7:   DANNY-7 SCORE 3/23/2022 5/4/2022   Total Score 13 (moderate anxiety) -   Total Score 13 4     PROMIS 10-Global Health (all questions and answers displayed):   PROMIS 10 3/23/2022   In general, would you say your health is: Good   In general, would you say your quality of life is: Fair   In general, how would you rate your physical health? Good   In general, how would you rate your mental health, including your mood and your ability to think? Fair   In general, how would you rate your satisfaction with your social activities and relationships? Fair   In general, please rate  how well you carry out your usual social activities and roles Fair   To what extent are you able to carry out your everyday physical activities such as walking, climbing stairs, carrying groceries, or moving a chair? Mostly   How often have you been bothered by emotional problems such as feeling anxious, depressed or irritable? Often   How would you rate your fatigue on average? Moderate   How would you rate your pain on average?   0 = No Pain  to  10 = Worst Imaginable Pain 6   In general, would you say your health is: 3   In general, would you say your quality of life is: 2   In general, how would you rate your physical health? 3   In general, how would you rate your mental health, including your mood and your ability to think? 2   In general, how would you rate your satisfaction with your social activities and relationships? 2   In general, please rate how well you carry out your usual social activities and roles. (This includes activities at home, at work and in your community, and responsibilities as a parent, child, spouse, employee, friend, etc.) 2   To what extent are you able to carry out your everyday physical activities such as walking, climbing stairs, carrying groceries, or moving a chair? 4   In the past 7 days, how often have you been bothered by emotional problems such as feeling anxious, depressed, or irritable? 4   In the past 7 days, how would you rate your fatigue on average? 3   In the past 7 days, how would you rate your pain on average, where 0 means no pain, and 10 means worst imaginable pain? 6   Global Mental Health Score 8   Global Physical Health Score 13   PROMIS TOTAL - SUBSCORES 21         ASSESSMENT: Current Emotional / Mental Status (status of significant symptoms):   Risk status (Self / Other harm or suicidal ideation)   Patient denies current fears or concerns for personal safety.   Patient reports the following current or recent suicidal ideation or behaviors: At times patient has  passive intrusive thoughts of not wanting to be alive without a plan or strong intention to hurt or harm self and has strong protective factors. .   Patient denies current or recent homicidal ideation or behaviors.   Patient denies current or recent self injurious behavior or ideation.   Patient denies other safety concerns.   Patient reports there has been no change in risk factors since their last session.     Patient reports there has been no change in protective factors since their last session.     Recommended that patient call 911 or go to the local ED should there be a change in any of these risk factors.     Appearance:   Appropriate    Eye Contact:   Good    Psychomotor Behavior: Normal    Attitude:   Cooperative  Friendly Pleasant Attentive   Orientation:   All   Speech    Rate / Production: Normal/ Responsive    Volume:  Normal    Mood:    Anxious  Depressed  Ambivalence   Affect:    Worrisome    Thought Content:  Rumination    Thought Form:  Blocking    Insight:    Good      Medication Review:   No current psychiatric medications prescribed     Medication Compliance:   NA     Changes in Health Issues:   None reported     Chemical Use Review:   Substance Use: Chemical use reviewed, no active concerns identified   Continue assessing during course of therapy.     Tobacco Use: No current tobacco use.      Diagnosis:  1. Major depressive disorder, recurrent episode, severe with anxious distress (H)    2. DANNY (generalized anxiety disorder)        Collateral Reports Completed:   Not Applicable    PLAN: (Patient Tasks / Therapist Tasks / Other)  Previous sessions:  Worked on Tx plan in session today. Current session: Patient will continue to journal and look for themes in his journaling that is connected to his ego despair. Continue to follow with his healthy routines; exercise, starting and ending his day on a schedule, journaling, sleep hygiene strategies. Apply self compassion to core hurts that are  triggered.  Concentrate on connecting to positive affirmations and strengths about himself and think less about having to be at his best all of the time and less connection to perfectionistic traits.  Be open to accepting compliments, accolades and positive things that are said about him.  Submitted order for  psych evaluation for possible ADHD diagnosis since he is having difficulty with concentration and attention.          Babar Gil, LICSW                                                         ______________________________________________________________________    Individual Treatment Plan    Patient's Name: Miguel Fleming  YOB: 1995    Date of Creation: 4-21-22  Date Treatment Plan Last Reviewed/Revised: 4-21-22    DSM5 Diagnoses: 296.33 (F33.2) Major Depressive Disorder, Recurrent Episode, Severe _ and With anxious distress or 300.02 (F41.1) Generalized Anxiety Disorder  Psychosocial / Contextual Factors: Past relationship issues, job and school stress, at times family relationship issues    PROMIS (reviewed every 90 days): 3-23-22    Referral / Collaboration:  Referral to another professional/service is not indicated at this time..    Anticipated number of session for this episode of care: 12  Anticipation frequency of session: Every other week  Anticipated Duration of each session: 38-52 minutes  Treatment plan will be reviewed in 90 days or when goals have been changed.       MeasurableTreatment Goal(s) related to diagnosis / functional impairment(s)  Goal 1: Patient will decrease his depression and anxiety symptoms and improve his overall functioning.    I will know I've met my goal when I am sleeping better, have addressed my past relationship in a healthy way, deal with my compulsions in a positive way and feel happier in life.      Objective #A (Patient Action)    Patient will use cognitive strategies identified in therapy to challenge anxious thoughts.  Status: Continued - Date(s):  4-21-22    Intervention(s)  Therapist will teach CBT skills and thought stopping process and practice until skills becomes automatic.    Objective #B  Patient will improve his overall self esteem. Move towards connecting to positive ego integrity.   Status: Continued - Date(s): 4-21-22    Intervention(s)  Therapist will assign homework continue journaling, concentrate on daily strengths and positive affirmations      Objective #C  Patient will improve his overall sleep.  Status: Continued - Date(s): 4-21-22    Intervention(s)  Therapist will engage in sleep hygiene strategies to improve his overall sleep.       Patient has reviewed and agreed to the above plan.      Babar Gil, [facility]  April 21, 2022

## 2022-05-24 ENCOUNTER — VIRTUAL VISIT (OUTPATIENT)
Dept: PSYCHOLOGY | Facility: CLINIC | Age: 27
End: 2022-05-24
Payer: COMMERCIAL

## 2022-05-24 DIAGNOSIS — F41.1 GAD (GENERALIZED ANXIETY DISORDER): ICD-10-CM

## 2022-05-24 DIAGNOSIS — F33.2 MAJOR DEPRESSIVE DISORDER, RECURRENT EPISODE, SEVERE WITH ANXIOUS DISTRESS (H): Primary | ICD-10-CM

## 2022-05-24 PROCEDURE — 90834 PSYTX W PT 45 MINUTES: CPT | Mod: 95 | Performed by: SOCIAL WORKER

## 2022-05-24 NOTE — PROGRESS NOTES
[facility]                                     Progress Note    Patient Name: Miguel Fleming  Date: 22         Service Type: Individual      Session Start Time: 4:00  Session End Time: 4:50     Session Length: 50    Session #: 6    Attendees: Client    Service Modality:  Video Visit:      Provider verified identity through the following two step process.  Patient provided:  Patient photo and Patient     Telemedicine Visit: The patient's condition can be safely assessed and treated via synchronous audio and visual telemedicine encounter.      Reason for Telemedicine Visit: Patient has requested telehealth visit    Originating Site (Patient Location): Patient's home    Distant Site (Provider Location): Provider Remote Setting- Home Office    Consent:  The patient/guardian has verbally consented to: the potential risks and benefits of telemedicine (video visit) versus in person care; bill my insurance or make self-payment for services provided; and responsibility for payment of non-covered services.     Patient would like the video invitation sent by:  My Chart    Mode of Communication:  Video Conference via Amwell    As the provider I attest to compliance with applicable laws and regulations related to telemedicine.    DATA  Interactive Complexity: No  Crisis: No        Progress Since Last Session (Related to Symptoms / Goals / Homework):   Symptoms: Improving less anxiety and depression symptoms this session    Homework: Achieved / completed to satisfaction Previous sessions:  Client was able to journal and look at themes of his journaling and we concentrated on the issues he thinks about most often and how he can feel better about these issues in his life.  Current session: Client is continuing to journal and look at themes that connect to ego despair or low self esteem and worth issues.  Most of this thinking is from his past relationship and how she made him feel and how this has  effected his self esteem, confidence and character. He is working on being more gracious, accepting compliments and recognition.  Apply self compassion to core hurts that are triggered to raise his overall self esteem. Continue to lessen his perfectionistic traits and concentrate on his strengths, positive affirmations and abilities.  He has been dating and this is boosting his self esteem.  He is working on his self esteem skills and self compassion to heal his core hurts.        Episode of Care Goals: Satisfactory progress - ACTION (Actively working towards change); Intervened by reinforcing change plan / affirming steps taken     Current / Ongoing Stressors and Concerns:   Past relationship issues, job and school stress, at times family relationship issues     Treatment Objective(s) Addressed in This Session:   Connect to strengths and ego integrity  Journal and determine issues and themes he is writing about  Continue to his regular routine and activities that make him feel good; improve sleep, exercise, waking up and finishing the day on schedule     Intervention:   Motivational Interviewing: [employer]  Strength-Based Therapy    Assessments completed prior to visit:  The following assessments were completed by patient for this visit:  PHQ9:   PHQ-9 SCORE 3/30/2022 5/4/2022   PHQ-9 Total Score MyChart 21 (Severe depression) -   PHQ-9 Total Score 21 9     GAD7:   DANNY-7 SCORE 3/23/2022 5/4/2022   Total Score 13 (moderate anxiety) -   Total Score 13 4     PROMIS 10-Global Health (all questions and answers displayed):   PROMIS 10 3/23/2022   In general, would you say your health is: Good   In general, would you say your quality of life is: Fair   In general, how would you rate your physical health? Good   In general, how would you rate your mental health, including your mood and your ability to think? Fair   In general, how would you rate your satisfaction with your social activities and relationships? Fair   In  general, please rate how well you carry out your usual social activities and roles Fair   To what extent are you able to carry out your everyday physical activities such as walking, climbing stairs, carrying groceries, or moving a chair? Mostly   How often have you been bothered by emotional problems such as feeling anxious, depressed or irritable? Often   How would you rate your fatigue on average? Moderate   How would you rate your pain on average?   0 = No Pain  to  10 = Worst Imaginable Pain 6   In general, would you say your health is: 3   In general, would you say your quality of life is: 2   In general, how would you rate your physical health? 3   In general, how would you rate your mental health, including your mood and your ability to think? 2   In general, how would you rate your satisfaction with your social activities and relationships? 2   In general, please rate how well you carry out your usual social activities and roles. (This includes activities at home, at work and in your community, and responsibilities as a parent, child, spouse, employee, friend, etc.) 2   To what extent are you able to carry out your everyday physical activities such as walking, climbing stairs, carrying groceries, or moving a chair? 4   In the past 7 days, how often have you been bothered by emotional problems such as feeling anxious, depressed, or irritable? 4   In the past 7 days, how would you rate your fatigue on average? 3   In the past 7 days, how would you rate your pain on average, where 0 means no pain, and 10 means worst imaginable pain? 6   Global Mental Health Score 8   Global Physical Health Score 13   PROMIS TOTAL - SUBSCORES 21         ASSESSMENT: Current Emotional / Mental Status (status of significant symptoms):   Risk status (Self / Other harm or suicidal ideation)   Patient denies current fears or concerns for personal safety.   Patient reports the following current or recent suicidal ideation or behaviors:  At times patient has passive intrusive thoughts of not wanting to be alive without a plan or strong intention to hurt or harm self and has strong protective factors. .   Patient denies current or recent homicidal ideation or behaviors.   Patient denies current or recent self injurious behavior or ideation.   Patient denies other safety concerns.   Patient reports there has been no change in risk factors since their last session.     Patient reports there has been no change in protective factors since their last session.     Recommended that patient call 911 or go to the local ED should there be a change in any of these risk factors.     Appearance:   Appropriate    Eye Contact:   Good    Psychomotor Behavior: Normal    Attitude:   Cooperative  Friendly Pleasant Attentive   Orientation:   All   Speech    Rate / Production: Normal/ Responsive    Volume:  Normal    Mood:    Anxious  Depressed  Ambivalence   Affect:    Worrisome    Thought Content:  Rumination    Thought Form:  Blocking    Insight:    Good      Medication Review:   No current psychiatric medications prescribed     Medication Compliance:   NA     Changes in Health Issues:   None reported     Chemical Use Review:   Substance Use: Chemical use reviewed, no active concerns identified   Continue assessing during course of therapy.     Tobacco Use: No current tobacco use.      Diagnosis:  1. Major depressive disorder, recurrent episode, severe with anxious distress (H)    2. DANNY (generalized anxiety disorder)        Collateral Reports Completed:   Not Applicable    PLAN: (Patient Tasks / Therapist Tasks / Other)  Previous sessions:  Worked on Tx plan in session today. Current session: Patient will continue to journal and look for themes in his journaling that is connected to his ego despair. Continue to follow with his healthy routines; exercise, starting and ending his day on a schedule, journaling, sleep hygiene strategies. Apply self compassion to core  hurts that are triggered.  Concentrate on connecting to positive affirmations and strengths about himself and think less about having to be at his best all of the time and less connection to perfectionistic traits.  Be open to accepting compliments, accolades and positive things that are said about him.  Submitted order for  psych evaluation for possible ADHD diagnosis since he is having difficulty with concentration and attention. Connect to his thought stopping and CBT skills. Start a consistent healthy morning routine, release emotions that are negative and let them go.  Connect to ego integrity vs despair.          Babar Gil, [facility]                                                         ______________________________________________________________________    Individual Treatment Plan    Patient's Name: Miguel Fleming  YOB: 1995    Date of Creation: 4-21-22  Date Treatment Plan Last Reviewed/Revised: 4-21-22    DSM5 Diagnoses: 296.33 (F33.2) Major Depressive Disorder, Recurrent Episode, Severe _ and With anxious distress or 300.02 (F41.1) Generalized Anxiety Disorder  Psychosocial / Contextual Factors: Past relationship issues, job and school stress, at times family relationship issues    PROMIS (reviewed every 90 days): 3-23-22    Referral / Collaboration:  Referral to another professional/service is not indicated at this time..    Anticipated number of session for this episode of care: 12  Anticipation frequency of session: Every other week  Anticipated Duration of each session: 38-52 minutes  Treatment plan will be reviewed in 90 days or when goals have been changed.       MeasurableTreatment Goal(s) related to diagnosis / functional impairment(s)  Goal 1: Patient will decrease his depression and anxiety symptoms and improve his overall functioning.    I will know I've met my goal when I am sleeping better, have addressed my past relationship in a healthy way, deal with my compulsions in a  positive way and feel happier in life.      Objective #A (Patient Action)    Patient will use cognitive strategies identified in therapy to challenge anxious thoughts.  Status: Continued - Date(s): 4-21-22    Intervention(s)  Therapist will teach CBT skills and thought stopping process and practice until skills becomes automatic.    Objective #B  Patient will improve his overall self esteem. Move towards connecting to positive ego integrity.   Status: Continued - Date(s): 4-21-22    Intervention(s)  Therapist will assign homework continue journaling, concentrate on daily strengths and positive affirmations      Objective #C  Patient will improve his overall sleep.  Status: Continued - Date(s): 4-21-22    Intervention(s)  Therapist will engage in sleep hygiene strategies to improve his overall sleep.       Patient has reviewed and agreed to the above plan.      Babar Gil [facility]  April 21, 2022

## 2022-06-07 ENCOUNTER — VIRTUAL VISIT (OUTPATIENT)
Dept: PSYCHOLOGY | Facility: CLINIC | Age: 27
End: 2022-06-07
Payer: COMMERCIAL

## 2022-06-07 DIAGNOSIS — F33.2 MAJOR DEPRESSIVE DISORDER, RECURRENT EPISODE, SEVERE WITH ANXIOUS DISTRESS (H): Primary | ICD-10-CM

## 2022-06-07 DIAGNOSIS — F41.1 GAD (GENERALIZED ANXIETY DISORDER): ICD-10-CM

## 2022-06-07 PROCEDURE — 90834 PSYTX W PT 45 MINUTES: CPT | Mod: 95 | Performed by: SOCIAL WORKER

## 2022-06-07 ASSESSMENT — ANXIETY QUESTIONNAIRES
1. FEELING NERVOUS, ANXIOUS, OR ON EDGE: SEVERAL DAYS
7. FEELING AFRAID AS IF SOMETHING AWFUL MIGHT HAPPEN: SEVERAL DAYS
5. BEING SO RESTLESS THAT IT IS HARD TO SIT STILL: NOT AT ALL
GAD7 TOTAL SCORE: 2
6. BECOMING EASILY ANNOYED OR IRRITABLE: NOT AT ALL
2. NOT BEING ABLE TO STOP OR CONTROL WORRYING: NOT AT ALL
IF YOU CHECKED OFF ANY PROBLEMS ON THIS QUESTIONNAIRE, HOW DIFFICULT HAVE THESE PROBLEMS MADE IT FOR YOU TO DO YOUR WORK, TAKE CARE OF THINGS AT HOME, OR GET ALONG WITH OTHER PEOPLE: SOMEWHAT DIFFICULT
GAD7 TOTAL SCORE: 2
3. WORRYING TOO MUCH ABOUT DIFFERENT THINGS: NOT AT ALL

## 2022-06-07 ASSESSMENT — PATIENT HEALTH QUESTIONNAIRE - PHQ9
SUM OF ALL RESPONSES TO PHQ QUESTIONS 1-9: 8
5. POOR APPETITE OR OVEREATING: NOT AT ALL

## 2022-06-07 NOTE — PROGRESS NOTES
[facility]                                     Progress Note    Patient Name: Miguel Fleming  Date: 22         Service Type: Individual      Session Start Time: 4:00  Session End Time: 4:50     Session Length: 50    Session #: 7    Attendees: Client    Service Modality:  Video Visit:      Provider verified identity through the following two step process.  Patient provided:  Patient photo and Patient     Telemedicine Visit: The patient's condition can be safely assessed and treated via synchronous audio and visual telemedicine encounter.      Reason for Telemedicine Visit: Patient has requested telehealth visit    Originating Site (Patient Location): Patient's home    Distant Site (Provider Location): Provider Remote Setting- Home Office    Consent:  The patient/guardian has verbally consented to: the potential risks and benefits of telemedicine (video visit) versus in person care; bill my insurance or make self-payment for services provided; and responsibility for payment of non-covered services.     Patient would like the video invitation sent by:  My Chart    Mode of Communication:  Video Conference via Amwell    As the provider I attest to compliance with applicable laws and regulations related to telemedicine.    DATA  Interactive Complexity: No  Crisis: No        Progress Since Last Session (Related to Symptoms / Goals / Homework):   Symptoms: Improving less anxiety and depression symptoms this session    Homework: Achieved / completed to satisfaction Previous sessions:  Client was able to journal and look at themes of his journaling and we concentrated on the issues he thinks about most often and how he can feel better about these issues in his life.  Client is continuing to journal and look at themes that connect to ego despair or low self esteem and worth issues.  Most of this thinking is from his past relationship and how she made him feel and how this has effected his self  esteem, confidence and character. He is working on being more gracious, accepting compliments and recognition.  Apply self compassion to core hurts that are triggered to raise his overall self esteem.Current session:  Continue to lessen his perfectionistic traits and concentrate on his strengths, positive affirmations and abilities.  He has been dating and this is boosting his self esteem.  He is working on his self esteem skills and self compassion to heal his core hurts.  Is more apathetic about life and not real motivated to change and make improvements in his life.  More thinking that things are not important and life is just there and not a lot of happiness or excitement about life currently.  We discussed ways that he can improve this in session today.       Episode of Care Goals: Satisfactory progress - ACTION (Actively working towards change); Intervened by reinforcing change plan / affirming steps taken     Current / Ongoing Stressors and Concerns:   Past relationship issues, job and school stress, at times family relationship issues     Treatment Objective(s) Addressed in This Session:   Connect to strengths and ego integrity  Journal and determine issues and themes he is writing about  Continue to his regular routine and activities that make him feel good; improve sleep, exercise, waking up and finishing the day on schedule     Intervention:   Motivational Interviewing: [employer]  Strength-Based Therapy    Assessments completed prior to visit:  The following assessments were completed by patient for this visit:  PHQ9:   PHQ-9 SCORE 3/30/2022 5/4/2022 6/7/2022   PHQ-9 Total Score MyChart 21 (Severe depression) - -   PHQ-9 Total Score 21 9 8     GAD7:   DANNY-7 SCORE 3/23/2022 5/4/2022 6/7/2022   Total Score 13 (moderate anxiety) - -   Total Score 13 4 2     PROMIS 10-Global Health (all questions and answers displayed):   PROMIS 10 3/23/2022   In general, would you say your health is: Good   In general,  would you say your quality of life is: Fair   In general, how would you rate your physical health? Good   In general, how would you rate your mental health, including your mood and your ability to think? Fair   In general, how would you rate your satisfaction with your social activities and relationships? Fair   In general, please rate how well you carry out your usual social activities and roles Fair   To what extent are you able to carry out your everyday physical activities such as walking, climbing stairs, carrying groceries, or moving a chair? Mostly   How often have you been bothered by emotional problems such as feeling anxious, depressed or irritable? Often   How would you rate your fatigue on average? Moderate   How would you rate your pain on average?   0 = No Pain  to  10 = Worst Imaginable Pain 6   In general, would you say your health is: 3   In general, would you say your quality of life is: 2   In general, how would you rate your physical health? 3   In general, how would you rate your mental health, including your mood and your ability to think? 2   In general, how would you rate your satisfaction with your social activities and relationships? 2   In general, please rate how well you carry out your usual social activities and roles. (This includes activities at home, at work and in your community, and responsibilities as a parent, child, spouse, employee, friend, etc.) 2   To what extent are you able to carry out your everyday physical activities such as walking, climbing stairs, carrying groceries, or moving a chair? 4   In the past 7 days, how often have you been bothered by emotional problems such as feeling anxious, depressed, or irritable? 4   In the past 7 days, how would you rate your fatigue on average? 3   In the past 7 days, how would you rate your pain on average, where 0 means no pain, and 10 means worst imaginable pain? 6   Global Mental Health Score 8   Global Physical Health Score 13    PROMIS TOTAL - SUBSCORES 21         ASSESSMENT: Current Emotional / Mental Status (status of significant symptoms):   Risk status (Self / Other harm or suicidal ideation)   Patient denies current fears or concerns for personal safety.   Patient reports the following current or recent suicidal ideation or behaviors: At times patient has passive intrusive thoughts of not wanting to be alive without a plan or strong intention to hurt or harm self and has strong protective factors. . Fleeting thoughts today without strong intent or plan   Patient denies current or recent homicidal ideation or behaviors.   Patient denies current or recent self injurious behavior or ideation.   Patient denies other safety concerns.   Patient reports there has been no change in risk factors since their last session.     Patient reports there has been no change in protective factors since their last session.     Recommended that patient call 911 or go to the local ED should there be a change in any of these risk factors.     Appearance:   Appropriate    Eye Contact:   Good    Psychomotor Behavior: Normal    Attitude:   Cooperative  Friendly Pleasant Attentive   Orientation:   All   Speech    Rate / Production: Normal/ Responsive    Volume:  Normal    Mood:    Anxious  Depressed  Anhedonia Ambivalence   Affect:    Worrisome    Thought Content:  Rumination    Thought Form:  Blocking    Insight:    Good      Medication Review:   No current psychiatric medications prescribed     Medication Compliance:   NA     Changes in Health Issues:   None reported     Chemical Use Review:   Substance Use: Chemical use reviewed, no active concerns identified   Continue assessing during course of therapy.     Tobacco Use: No current tobacco use.      Diagnosis:  1. Major depressive disorder, recurrent episode, severe with anxious distress (H)    2. DANNY (generalized anxiety disorder)        Collateral Reports Completed:   Not Applicable    PLAN: (Patient  Tasks / Therapist Tasks / Other)  Previous sessions:  Worked on Tx plan in session today. Current session: Patient will continue to journal and look for themes in his journaling that is connected to his ego despair. Continue to follow with his healthy routines; exercise, starting and ending his day on a schedule, journaling, sleep hygiene strategies. Apply self compassion to core hurts that are triggered.  Concentrate on connecting to positive affirmations and strengths about himself and think less about having to be at his best all of the time and less connection to perfectionistic traits.  Be open to accepting compliments, accolades and positive things that are said about him.  Submitted order for  psych evaluation for possible ADHD diagnosis since he is having difficulty with concentration and attention. Connect to his thought stopping and CBT skills. Start a consistent healthy morning routine, release emotions that are negative and let them go.  Connect to ego integrity vs despair. Client will create some smart goals and get out more and push beyond his comfort zone and enjoy life.          Babar Gil, [facility]                                                         ______________________________________________________________________    Individual Treatment Plan    Patient's Name: Miguel Fleming  YOB: 1995    Date of Creation: 4-21-22  Date Treatment Plan Last Reviewed/Revised: 4-21-22    DSM5 Diagnoses: 296.33 (F33.2) Major Depressive Disorder, Recurrent Episode, Severe _ and With anxious distress or 300.02 (F41.1) Generalized Anxiety Disorder  Psychosocial / Contextual Factors: Past relationship issues, job and school stress, at times family relationship issues    PROMIS (reviewed every 90 days): 3-23-22    Referral / Collaboration:  Referral to another professional/service is not indicated at this time..    Anticipated number of session for this episode of care: 12  Anticipation frequency of  session: Every other week  Anticipated Duration of each session: 38-52 minutes  Treatment plan will be reviewed in 90 days or when goals have been changed.       MeasurableTreatment Goal(s) related to diagnosis / functional impairment(s)  Goal 1: Patient will decrease his depression and anxiety symptoms and improve his overall functioning.    I will know I've met my goal when I am sleeping better, have addressed my past relationship in a healthy way, deal with my compulsions in a positive way and feel happier in life.      Objective #A (Patient Action)    Patient will use cognitive strategies identified in therapy to challenge anxious thoughts.  Status: Continued - Date(s): 4-21-22    Intervention(s)  Therapist will teach CBT skills and thought stopping process and practice until skills becomes automatic.    Objective #B  Patient will improve his overall self esteem. Move towards connecting to positive ego integrity.   Status: Continued - Date(s): 4-21-22    Intervention(s)  Therapist will assign homework continue journaling, concentrate on daily strengths and positive affirmations      Objective #C  Patient will improve his overall sleep.  Status: Continued - Date(s): 4-21-22    Intervention(s)  Therapist will engage in sleep hygiene strategies to improve his overall sleep.       Patient has reviewed and agreed to the above plan.      Babar Gil, [facility]  April 21, 2022

## 2022-06-28 ENCOUNTER — VIRTUAL VISIT (OUTPATIENT)
Dept: PSYCHOLOGY | Facility: CLINIC | Age: 27
End: 2022-06-28
Payer: COMMERCIAL

## 2022-06-28 DIAGNOSIS — F41.1 GAD (GENERALIZED ANXIETY DISORDER): ICD-10-CM

## 2022-06-28 DIAGNOSIS — F33.2 MAJOR DEPRESSIVE DISORDER, RECURRENT EPISODE, SEVERE WITH ANXIOUS DISTRESS (H): Primary | ICD-10-CM

## 2022-06-28 NOTE — PROGRESS NOTES
Discharge Summary  Multiple Sessions    Client Name: Miguel Fleming MRN#: [id number] YOB: 1995      Intake / Discharge Date: 6-28-22      DSM5 Diagnoses: (Sustained by DSM5 Criteria Listed Above)  Diagnoses: 296.33 (F33.2) Major Depressive Disorder, Recurrent Episode, Severe _ and With anxious distress  300.02 (F41.1) Generalized Anxiety Disorder  Psychosocial & Contextual Factors: Past relationship issues, work and school stress, family relationship stress            Presenting Concern:  Working through relationship ending, moving on from the relationship, concentrating on strengths and positive school and work experiences; managing anxiety and depression symptoms in proactive and positive ways.       Reason for Discharge:  Client is satisfied with progress      Disposition at Time of Last Encounter:   Comments: Patient was told about providers medical leave; he decided to discharge from counseling due to meeting his goals in therapy with the option of returning if needed in the future.        Risk Management:   Client has had a history of suicidal ideation: fleeting thoughts but with out specific plan  Recommended that patient call 911 or go to the local ED should there be a change in any of these risk factors.      Referred To:  No referral at this time; is welcome to come back to therapy at anytime in the future as needed.         Babar Gil, [facility]   6/28/2022

## 2022-09-18 ENCOUNTER — HEALTH MAINTENANCE LETTER (OUTPATIENT)
Age: 27
End: 2022-09-18

## 2022-11-30 ENCOUNTER — VIRTUAL VISIT (OUTPATIENT)
Dept: PSYCHOLOGY | Facility: CLINIC | Age: 27
End: 2022-11-30
Payer: COMMERCIAL

## 2022-11-30 DIAGNOSIS — F33.2 MAJOR DEPRESSIVE DISORDER, RECURRENT EPISODE, SEVERE WITH ANXIOUS DISTRESS (H): Primary | ICD-10-CM

## 2022-11-30 DIAGNOSIS — F41.1 GAD (GENERALIZED ANXIETY DISORDER): ICD-10-CM

## 2022-11-30 PROCEDURE — 90834 PSYTX W PT 45 MINUTES: CPT | Mod: 95 | Performed by: SOCIAL WORKER

## 2022-11-30 ASSESSMENT — ANXIETY QUESTIONNAIRES
2. NOT BEING ABLE TO STOP OR CONTROL WORRYING: SEVERAL DAYS
7. FEELING AFRAID AS IF SOMETHING AWFUL MIGHT HAPPEN: MORE THAN HALF THE DAYS
6. BECOMING EASILY ANNOYED OR IRRITABLE: NOT AT ALL
3. WORRYING TOO MUCH ABOUT DIFFERENT THINGS: SEVERAL DAYS
5. BEING SO RESTLESS THAT IT IS HARD TO SIT STILL: NOT AT ALL
IF YOU CHECKED OFF ANY PROBLEMS ON THIS QUESTIONNAIRE, HOW DIFFICULT HAVE THESE PROBLEMS MADE IT FOR YOU TO DO YOUR WORK, TAKE CARE OF THINGS AT HOME, OR GET ALONG WITH OTHER PEOPLE: SOMEWHAT DIFFICULT
1. FEELING NERVOUS, ANXIOUS, OR ON EDGE: SEVERAL DAYS
GAD7 TOTAL SCORE: 6
GAD7 TOTAL SCORE: 6

## 2022-11-30 ASSESSMENT — PATIENT HEALTH QUESTIONNAIRE - PHQ9
5. POOR APPETITE OR OVEREATING: SEVERAL DAYS
SUM OF ALL RESPONSES TO PHQ QUESTIONS 1-9: 5

## 2022-11-30 NOTE — PROGRESS NOTES
[facility]                                     Progress Note    Patient Name: Miguel Fleming  Date: 22         Service Type: Individual      Session Start Time: 10:30  Session End Time: 11:20     Session Length: 50    Session #: 8    Attendees: Client    Service Modality:  Video Visit:      Provider verified identity through the following two step process.  Patient provided:  Patient photo and Patient     Telemedicine Visit: The patient's condition can be safely assessed and treated via synchronous audio and visual telemedicine encounter.      Reason for Telemedicine Visit: Patient has requested telehealth visit    Originating Site (Patient Location): Patient's home    Distant Site (Provider Location): Provider Remote Setting- Home Office    Consent:  The patient/guardian has verbally consented to: the potential risks and benefits of telemedicine (video visit) versus in person care; bill my insurance or make self-payment for services provided; and responsibility for payment of non-covered services.     Patient would like the video invitation sent by:  My Chart    Mode of Communication:  Video Conference via [employer]well    As the provider I attest to compliance with applicable laws and regulations related to telemedicine.    DATA  Interactive Complexity: No  Crisis: No        Progress Since Last Session (Related to Symptoms / Goals / Homework):   Symptoms: Improving less anxiety and depression symptoms this session    Homework: Achieved / completed to satisfaction Previous sessions:  Client was able to journal and look at themes of his journaling and we concentrated on the issues he thinks about most often and how he can feel better about these issues in his life.  Client is continuing to journal and look at themes that connect to ego despair or low self esteem and worth issues.  Most of this thinking is from his past relationship and how she made him feel and how this has effected his self  esteem, confidence and character. He is working on being more gracious, accepting compliments and recognition.  Apply self compassion to core hurts that are triggered to raise his overall self esteem.  Continue to lessen his perfectionistic traits and concentrate on his strengths, positive affirmations and abilities.   He is working on his self esteem skills and self compassion to heal his core hurts.  Is more apathetic about life and not real motivated to change and make improvements in his life.  More thinking that things are not important and life is just there and not a lot of happiness or excitement about life currently.  We discussed ways that he can improve this in session today. Current session: Client started with counseling again and has consistent worries about the future.  We processed thoughts and feelings in session today.  Client is uncertain about his future, what he would like to do for a profession when he completes graduate school.  We discussed options, interventions and strategies to alleviate his anxiety and worry and improve his overall mood.       Episode of Care Goals: No improvement - CONTEMPLATION (Considering change and yet undecided); Intervened by assessing the negative and positive thinking (ambivalence) about behavior change     Current / Ongoing Stressors and Concerns:   Past relationship issues, job and school stress, at times family relationship issues     Treatment Objective(s) Addressed in This Session:   Connect to strengths and ego integrity  Journal and determine issues and themes he is writing about  Continue to his regular routine and activities that make him feel good; improve sleep, exercise, waking up and finishing the day on schedule     Intervention:   Motivational Interviewing: [employer]  Strength-Based Therapy    Assessments completed prior to visit:  The following assessments were completed by patient for this visit:  PHQ9:   PHQ-9 SCORE 3/30/2022 5/4/2022 6/7/2022  11/30/2022   PHQ-9 Total Score Jameehart 21 (Severe depression) - - -   PHQ-9 Total Score 21 9 8 5     GAD7:   DANNY-7 SCORE 3/23/2022 5/4/2022 6/7/2022 11/30/2022   Total Score 13 (moderate anxiety) - - -   Total Score 13 4 2 6     PROMIS 10-Global Health (all questions and answers displayed):   PROMIS 10 3/23/2022 11/30/2022   In general, would you say your health is: Good -   In general, would you say your quality of life is: Fair -   In general, how would you rate your physical health? Good -   In general, how would you rate your mental health, including your mood and your ability to think? Fair -   In general, how would you rate your satisfaction with your social activities and relationships? Fair -   In general, please rate how well you carry out your usual social activities and roles Fair -   To what extent are you able to carry out your everyday physical activities such as walking, climbing stairs, carrying groceries, or moving a chair? Mostly -   How often have you been bothered by emotional problems such as feeling anxious, depressed or irritable? Often -   How would you rate your fatigue on average? Moderate -   How would you rate your pain on average?   0 = No Pain  to  10 = Worst Imaginable Pain 6 -   In general, would you say your health is: 3 4   In general, would you say your quality of life is: 2 3   In general, how would you rate your physical health? 3 4   In general, how would you rate your mental health, including your mood and your ability to think? 2 3   In general, how would you rate your satisfaction with your social activities and relationships? 2 3   In general, please rate how well you carry out your usual social activities and roles. (This includes activities at home, at work and in your community, and responsibilities as a parent, child, spouse, employee, friend, etc.) 2 4   To what extent are you able to carry out your everyday physical activities such as walking, climbing stairs, carrying  groceries, or moving a chair? 4 4   In the past 7 days, how often have you been bothered by emotional problems such as feeling anxious, depressed, or irritable? 4 3   In the past 7 days, how would you rate your fatigue on average? 3 2   In the past 7 days, how would you rate your pain on average, where 0 means no pain, and 10 means worst imaginable pain? 6 1   Global Mental Health Score 8 12   Global Physical Health Score 13 16   PROMIS TOTAL - SUBSCORES 21 28         ASSESSMENT: Current Emotional / Mental Status (status of significant symptoms):   Risk status (Self / Other harm or suicidal ideation)   Patient denies current fears or concerns for personal safety.   Patient reports the following current or recent suicidal ideation or behaviors: At times patient has passive intrusive thoughts of not wanting to be alive without a plan or strong intention to hurt or harm self and has strong protective factors. . Fleeting thoughts today without strong intent or plan   Patient denies current or recent homicidal ideation or behaviors.   Patient denies current or recent self injurious behavior or ideation.   Patient denies other safety concerns.   Patient reports there has been no change in risk factors since their last session.     Patient reports there has been no change in protective factors since their last session.     Recommended that patient call 911 or go to the local ED should there be a change in any of these risk factors.     Appearance:   Appropriate    Eye Contact:   Good    Psychomotor Behavior: Normal    Attitude:   Cooperative  Friendly Pleasant Attentive   Orientation:   All   Speech    Rate / Production: Normal/ Responsive    Volume:  Normal    Mood:    Anxious  Depressed  Anhedonia Ambivalence   Affect:    Worrisome    Thought Content:  Rumination    Thought Form:  Blocking    Insight:    Good      Medication Review:   No current psychiatric medications prescribed     Medication  Compliance:   NA     Changes in Health Issues:   None reported     Chemical Use Review:   Substance Use: Chemical use reviewed, no active concerns identified   Continue assessing during course of therapy.     Tobacco Use: No current tobacco use.      Diagnosis:  1. Major depressive disorder, recurrent episode, severe with anxious distress (H)    2. DANNY (generalized anxiety disorder)        Collateral Reports Completed:   Not Applicable    PLAN: (Patient Tasks / Therapist Tasks / Other)  Previous sessions:  Worked on Tx plan in session today.  Patient will continue to journal and look for themes in his journaling that is connected to his ego despair. Continue to follow with his healthy routines; exercise, starting and ending his day on a schedule, journaling, sleep hygiene strategies. Apply self compassion to core hurts that are triggered.  Concentrate on connecting to positive affirmations and strengths about himself and think less about having to be at his best all of the time and less connection to perfectionistic traits.  Be open to accepting compliments, accolades and positive things that are said about him.  Submitted order for  psych evaluation for possible ADHD diagnosis since he is having difficulty with concentration and attention. Connect to his thought stopping and CBT skills. Start a consistent healthy morning routine, release emotions that are negative and let them go.  Connect to ego integrity vs despair. Client will create some smart goals and get out more and push beyond his comfort zone and enjoy life. Current session: Client will look further into career choices for the future that would interest him, continue with feelings journal to establish baseline on his feelings, engage in CBT skills to improve his mood.           Babar Gil, [facility]                                                         ______________________________________________________________________    Individual Treatment  Plan    Patient's Name: Miguel Fleming  YOB: 1995    Date of Creation: 4-21-22  Date Treatment Plan Last Reviewed/Revised: 11-30-22    DSM5 Diagnoses: 296.33 (F33.2) Major Depressive Disorder, Recurrent Episode, Severe _ and With anxious distress or 300.02 (F41.1) Generalized Anxiety Disorder  Psychosocial / Contextual Factors: Past relationship issues, job and school stress, at times family relationship issues    PROMIS (reviewed every 90 days): 11-30-22    Referral / Collaboration:  Referral to another professional/service is not indicated at this time..    Anticipated number of session for this episode of care: 12  Anticipation frequency of session: Every other week  Anticipated Duration of each session: 38-52 minutes  Treatment plan will be reviewed in 90 days or when goals have been changed.       MeasurableTreatment Goal(s) related to diagnosis / functional impairment(s)  Goal 1: Patient will decrease his depression and anxiety symptoms and improve his overall functioning.    I will know I've met my goal when I am sleeping better, have addressed my past relationship in a healthy way, deal with my compulsions in a positive way and feel happier in life.      Objective #A (Patient Action)    Patient will use cognitive strategies identified in therapy to challenge anxious thoughts.  Status: Continued - Date(s): 4-21-22, 11-30-22    Intervention(s)  Therapist will teach CBT skills and thought stopping process and practice until skills becomes automatic.    Objective #B  Patient will improve his overall self esteem. Move towards connecting to positive ego integrity.   Status: Continued - Date(s): 4-21-22, 11-30-22    Intervention(s)  Therapist will assign homework continue journaling, concentrate on daily strengths and positive affirmations      Objective #C  Patient will move towards a career choice for the future that will make him happy. .  Status: Continued - Date(s): 4-21-22,  11-30-22    Intervention(s)  Therapist will encourage patient to research possible career choices that he would be interested in pursuing after he has completed his Master's degree.       Patient has reviewed and agreed to the above plan.      Babar Gil [facility]  April 21, 2022

## 2023-10-08 ENCOUNTER — HEALTH MAINTENANCE LETTER (OUTPATIENT)
Age: 28
End: 2023-10-08

## 2024-12-01 ENCOUNTER — HEALTH MAINTENANCE LETTER (OUTPATIENT)
Age: 29
End: 2024-12-01